# Patient Record
Sex: MALE | Race: ASIAN | NOT HISPANIC OR LATINO | Employment: OTHER | URBAN - METROPOLITAN AREA
[De-identification: names, ages, dates, MRNs, and addresses within clinical notes are randomized per-mention and may not be internally consistent; named-entity substitution may affect disease eponyms.]

---

## 2021-09-05 ENCOUNTER — HOSPITAL ENCOUNTER (EMERGENCY)
Facility: HOSPITAL | Age: 66
Discharge: HOME | End: 2021-09-05
Admitting: PHYSICIAN ASSISTANT
Payer: MEDICARE

## 2021-09-05 ENCOUNTER — APPOINTMENT (OUTPATIENT)
Dept: CT IMAGING | Facility: HOSPITAL | Age: 66
End: 2021-09-05
Payer: MEDICARE

## 2021-09-05 VITALS
TEMPERATURE: 98.2 F | SYSTOLIC BLOOD PRESSURE: 165 MMHG | HEART RATE: 71 BPM | BODY MASS INDEX: 24.59 KG/M2 | HEIGHT: 64 IN | DIASTOLIC BLOOD PRESSURE: 100 MMHG | WEIGHT: 144 LBS | RESPIRATION RATE: 12 BRPM | OXYGEN SATURATION: 98 %

## 2021-09-05 DIAGNOSIS — N20.1 RIGHT URETERAL STONE: ICD-10-CM

## 2021-09-05 LAB
ALBUMIN UR-MCNC: NEGATIVE MG/DL
ANION GAP SERPL CALCULATED.3IONS-SCNC: 10 MMOL/L (ref 5–18)
APPEARANCE UR: CLEAR
BILIRUB UR QL STRIP: NEGATIVE
BUN SERPL-MCNC: 21 MG/DL (ref 8–22)
CALCIUM SERPL-MCNC: 9 MG/DL (ref 8.5–10.5)
CAOX CRY #/AREA URNS HPF: ABNORMAL /HPF
CHLORIDE BLD-SCNC: 105 MMOL/L (ref 98–107)
CO2 SERPL-SCNC: 23 MMOL/L (ref 22–31)
COLOR UR AUTO: COLORLESS
CREAT SERPL-MCNC: 2.02 MG/DL (ref 0.7–1.3)
ERYTHROCYTE [DISTWIDTH] IN BLOOD BY AUTOMATED COUNT: 12 % (ref 10–15)
GFR SERPL CREATININE-BSD FRML MDRD: 33 ML/MIN/1.73M2
GLUCOSE BLD-MCNC: 143 MG/DL (ref 70–125)
GLUCOSE UR STRIP-MCNC: NEGATIVE MG/DL
HCT VFR BLD AUTO: 45.4 % (ref 40–53)
HGB BLD-MCNC: 15.5 G/DL (ref 13.3–17.7)
HGB UR QL STRIP: ABNORMAL
KETONES UR STRIP-MCNC: NEGATIVE MG/DL
LEUKOCYTE ESTERASE UR QL STRIP: NEGATIVE
MCH RBC QN AUTO: 29.8 PG (ref 26.5–33)
MCHC RBC AUTO-ENTMCNC: 34.1 G/DL (ref 31.5–36.5)
MCV RBC AUTO: 87 FL (ref 78–100)
MUCOUS THREADS #/AREA URNS LPF: PRESENT /LPF
NITRATE UR QL: NEGATIVE
PH UR STRIP: 5 [PH] (ref 5–7)
PLATELET # BLD AUTO: 182 10E3/UL (ref 150–450)
POTASSIUM BLD-SCNC: 4.1 MMOL/L (ref 3.5–5)
RBC # BLD AUTO: 5.2 10E6/UL (ref 4.4–5.9)
RBC URINE: 4 /HPF
SODIUM SERPL-SCNC: 138 MMOL/L (ref 136–145)
SP GR UR STRIP: 1.01 (ref 1–1.03)
UROBILINOGEN UR STRIP-MCNC: <2 MG/DL
WBC # BLD AUTO: 9.4 10E3/UL (ref 4–11)
WBC URINE: 3 /HPF

## 2021-09-05 PROCEDURE — 74176 CT ABD & PELVIS W/O CONTRAST: CPT

## 2021-09-05 PROCEDURE — 81001 URINALYSIS AUTO W/SCOPE: CPT | Performed by: PHYSICIAN ASSISTANT

## 2021-09-05 PROCEDURE — 85027 COMPLETE CBC AUTOMATED: CPT | Performed by: PHYSICIAN ASSISTANT

## 2021-09-05 PROCEDURE — 99284 EMERGENCY DEPT VISIT MOD MDM: CPT | Mod: 25

## 2021-09-05 PROCEDURE — 80048 BASIC METABOLIC PNL TOTAL CA: CPT | Performed by: PHYSICIAN ASSISTANT

## 2021-09-05 PROCEDURE — 36415 COLL VENOUS BLD VENIPUNCTURE: CPT | Performed by: PHYSICIAN ASSISTANT

## 2021-09-05 RX ORDER — DIMENHYDRINATE 50 MG
50 TABLET ORAL EVERY 6 HOURS PRN
Qty: 10 TABLET | Refills: 0 | Status: SHIPPED | OUTPATIENT
Start: 2021-09-05 | End: 2021-09-12

## 2021-09-05 RX ORDER — DIMENHYDRINATE 50 MG
50 TABLET ORAL ONCE
Status: DISCONTINUED | OUTPATIENT
Start: 2021-09-05 | End: 2021-09-05 | Stop reason: HOSPADM

## 2021-09-05 RX ORDER — ACETAMINOPHEN 500 MG
1000 TABLET ORAL EVERY 6 HOURS
Qty: 20 TABLET | Refills: 0 | Status: SHIPPED | OUTPATIENT
Start: 2021-09-05 | End: 2021-09-12

## 2021-09-05 RX ORDER — DIMENHYDRINATE 50 MG
50 TABLET ORAL AT BEDTIME
Qty: 7 TABLET | Refills: 0 | Status: SHIPPED | OUTPATIENT
Start: 2021-09-05 | End: 2021-09-12

## 2021-09-05 RX ORDER — ACETAMINOPHEN 325 MG/1
975 TABLET ORAL ONCE
Status: DISCONTINUED | OUTPATIENT
Start: 2021-09-05 | End: 2021-09-05 | Stop reason: HOSPADM

## 2021-09-05 ASSESSMENT — MIFFLIN-ST. JEOR: SCORE: 1334.18

## 2021-09-05 NOTE — ED NOTES
Nursing Assessment: Renal: Patient presents here for evaluation of right flank pain that has occurred over the past 40 hours. He has a history of kidney stones and has required lithotripsy to remove the stones. He reports no change to urine or voiding pattern. Lung sounds clear, heart tones normal.

## 2021-09-05 NOTE — ED TRIAGE NOTES
Pt developed right flank pain 2 days ago and it is hard to sleep. Pt had left kidney stones 2 months ago and had surgery for that.  No nausea.

## 2021-09-05 NOTE — DISCHARGE INSTRUCTIONS
Your CT scan shows a right-sided ureteral stone that is likely causing your pain and symptoms.  You should pass this on your own given the size.    Your blood work does show some acute kidney injury which is likely related to your stone.  We did speak with the [facility] and they feel that you should be good to go home as well.    Recommend Tylenol as needed and or the Dramamine that we have prescribed.  Strain your urine so that you can collect the stone and have it analyzed.    The [facility] should call you on Tuesday morning to schedule a follow-up early next week.  If you have severe pain, vomiting, weakness, fatigue, dizziness or other worsening symptoms in the meantime, return to the ER.

## 2021-09-05 NOTE — ED NOTES
Neuro: Oriented x4    VSS: hypertensive on RA  Resp: Clear    Cardio: WDL.   GI/: Voiding adequately ,   Pain/Discomfort:Pt developed right flank pain 2 days ago and it is hard to sleep. Pt had left kidney stones 2 months ago and had surgery for that, Pain 7/10 refusing pain meds   Activity: Up independently   Skin: CDI .

## 2021-09-05 NOTE — ED PROVIDER NOTES
ED PROVIDER NOTE    EMERGENCY DEPARTMENT ENCOUNTER      NAME: Messi Santoro  AGE: 66 year old male  YOB: 1955  MRN: [id number]  EVALUATION DATE & TIME: 9/5/2021  9:50 AM    PCP: No primary care provider on file.    ED PROVIDER: Aileen Tay PA-C      Chief Complaint   Patient presents with     Flank Pain         FINAL IMPRESSION:  1. Right ureteral stone          MEDICAL DECISION MAKING:    Pertinent Labs & Imaging studies reviewed. (See chart for details)    66 year old male with a h/o left ureteral stone presenting with right back/flank pain.  Vitals reveal hypertension on arrival which is likely related to pain. Afebrile and otherwise vitals stable.  On exam has flares of pain but overall comfortable appearing. Abdomen is soft and NT.    Presentation most concerning for ureteral stone. Lab work reveals a slight TANVIR with Cr. 2.0.  It looks like with prior kidney stones he has also experiences some TANVIR.  UA w/o signs of infection.    CT scan revealed right sided ureteral stone [id number] with hydronephrosis.  With the TANVIR I did discuss management with Dr. Giovany CALIX who felt if symptoms controlled and no signs of infection, given size of stone patient could be discharged and f/u.   Patient has refused any meds here for pain and on re-evaluate was doing better and pain mostly resolved.      I have sent referral to [facility] and patient knows to follow up. I reviewed the kidney function change with patient and discussed signs and symptoms that should prompt return to the ER and patient is comfortable with this plan.     At the conclusion of the encounter I discussed the results of all of the tests and the disposition. The questions were answered. The patient or family acknowledged understanding and was agreeable with the care plan.       ED COURSE  10:27 AM  Met and evaluated patient. Discussed ED plan.   1:45 PM I updated patient.   1:56 PM I spoke to Dr. Adair from [facility].   2:07 PM I updated patient on discussion  "with Dr. Adair. Patient's pain is well controlled at this time.         MEDICATIONS GIVEN IN THE EMERGENCY:  Medications   acetaminophen (TYLENOL) tablet 975 mg (has no administration in time range)   dimenhyDRINATE (DRAMAMINE) tablet 50 mg (has no administration in time range)       NEW PRESCRIPTIONS STARTED AT TODAY'S ER VISIT  New Prescriptions    No medications on file          =================================================================    HPI    Patient information was obtained from: Patient    Use of Intrepreter: N/A         Messi Santoro is a 66 year old male with a history of left ureteral stone who presents to the ED via walk-in for the evaluation of back pain.     Patient reports right lower back pain that started about 1-2 days ago. He describes pain as 7/10 and non-radiating. He reports that he has not taken any medication for pain relief. Patient reports history of kidney stones and states he is \"unsure\" if pain is reminiscent.     Denies any nausea, vomiting, diarrhea, urinary symptoms, fever, chest pain, and shortness of breath. No other complaints at this time.     Per triage note, patient has difficulties sleeping due to pain. Patient had left kidney stones two months ago and had surgery for it.       REVIEW OF SYSTEMS   Constitutional: Negative for fevers, chills.  Pulmonary: Negative for shortness of breath  Cardiac: Negative for chest pain  GI:Negative for nausea, vomiting, diarrhea, abdominal pain  : Negative for urinary symptoms  Musculoskeletal: Positive for right lower back pain.     All other systems reviewed and are negative      PAST MEDICAL HISTORY:  Kidney stones    PAST SURGICAL HISTORY:  No past surgical history on file.        CURRENT MEDICATIONS:      Current Facility-Administered Medications:      acetaminophen (TYLENOL) tablet 975 mg, 975 mg, Oral, Once, Aileen Tay PA-C     dimenhyDRINATE (DRAMAMINE) tablet 50 mg, 50 mg, Oral, Once, Aileen Tay PA-C  No current " 23:00 outpatient medications on file.    ALLERGIES:  No Known Allergies    FAMILY HISTORY:  No family history on file.    SOCIAL HISTORY:   Smoking: None      VITALS:  Vitals:    09/05/21 1300 09/05/21 1315 09/05/21 1330 09/05/21 1345   BP:  (!) 175/99 (!) 178/106 (!) 165/100   Pulse: 78 71 73 71   Resp:       Temp:       TempSrc:       SpO2: 98% 98% 98% 98%   Weight:       Height:           PHYSICAL EXAM    General Appearance:  Alert, cooperative, no distress, appears stated age  HENT: Normocephalic without obvious deformity, atraumatic. Mucous membranes moist   Eyes: Conjunctiva clear, Lids normal. No discharge.   Respiratory: No distress. Lungs clear to ausculation bilaterally. No wheezes, rhonchi or stridor  Cardiovascular: Regular rate and rhythm, no murmur. Normal cap refill. No peripheral edema  GI: Abdomen soft, nontender, normal bowel sounds  : No CVA tenderness  Musculoskeletal: Moving all extremities. No swelling.   Integument: Warm, dry, no rashes or lesions  Neurologic: Alert and orientated x3. No focal deficits.  Psych: Normal mood and affect        LAB:  Labs Ordered and Resulted from Time of ED Arrival Up to the Time of Departure from the ED   ROUTINE UA WITH MICROSCOPIC REFLEX TO CULTURE - Abnormal; Notable for the following components:       Result Value    Blood Urine 0.1 mg/dL (*)     Mucus Urine Present (*)     Calcium Oxalate Crystals Urine Few (*)     RBC Urine 4 (*)     All other components within normal limits    Narrative:     Urine Culture not indicated   BASIC METABOLIC PANEL - Abnormal; Notable for the following components:    Creatinine 2.02 (*)     Glucose 143 (*)     GFR Estimate 33 (*)     All other components within normal limits   CBC WITH PLATELETS - Normal   CALL       RADIOLOGY:  CT Abdomen Pelvis w/o Contrast   Final Result   IMPRESSION:    1.  Obstructing 3 mm right mid ureteral stone with mild right-sided hydroureteronephrosis.                 Jayne COUGHLIN, am serving as a  scribe to document services personally performed by Aileen Tay PA-C based on my observation and the provider's statements to me. I, Aileen Tay PA-C attest that Jayne Lu is acting in a scribe capacity, has observed my performance of the services and has documented them in accordance with my direction.    Aileen Tay PA-C   Emergency Medicine     Aileen Tay PA-C  09/05/21 9325

## 2021-09-07 ENCOUNTER — TELEPHONE (OUTPATIENT)
Dept: UROLOGY | Facility: CLINIC | Age: 66
End: 2021-09-07

## 2021-09-07 NOTE — TELEPHONE ENCOUNTER
Left message for patient to call [facility] back regarding scheduling stone consultation appointment per referral from [facility] ER.

## 2021-09-08 ENCOUNTER — TELEPHONE (OUTPATIENT)
Dept: UROLOGY | Facility: CLINIC | Age: 66
End: 2021-09-08

## 2021-09-08 NOTE — TELEPHONE ENCOUNTER
Spoke to patient regarding recent stone consultation referral from [facility] ER.  Patient declined to schedule appointment.  He states he was never informed of a referral and why is [facility] calling.  He also states that if his PCP was not the referring doc then he will not schedule any appointments.  Per patient, he was told from ER that he should be able to pass the stone in a few days with provided medications from the ER.  I attempted to explain to patient several times that it was ok to not schedule an appointment if he preferred to have a referral from his PCP to their preference yet patient was not happy that [facility] called.  Phone call ended.

## 2021-10-16 ENCOUNTER — HEALTH MAINTENANCE LETTER (OUTPATIENT)
Age: 66
End: 2021-10-16

## 2022-10-01 ENCOUNTER — HEALTH MAINTENANCE LETTER (OUTPATIENT)
Age: 67
End: 2022-10-01

## 2023-02-05 ENCOUNTER — HEALTH MAINTENANCE LETTER (OUTPATIENT)
Age: 68
End: 2023-02-05

## 2024-03-03 ENCOUNTER — HEALTH MAINTENANCE LETTER (OUTPATIENT)
Age: 69
End: 2024-03-03